# Patient Record
Sex: MALE | Race: WHITE | NOT HISPANIC OR LATINO | Employment: OTHER | URBAN - METROPOLITAN AREA
[De-identification: names, ages, dates, MRNs, and addresses within clinical notes are randomized per-mention and may not be internally consistent; named-entity substitution may affect disease eponyms.]

---

## 2019-01-10 ENCOUNTER — TRANSCRIBE ORDERS (OUTPATIENT)
Dept: ADMINISTRATIVE | Facility: HOSPITAL | Age: 64
End: 2019-01-10

## 2019-01-10 DIAGNOSIS — R94.39 ABNORMAL STRESS TEST: Primary | ICD-10-CM

## 2019-01-18 ENCOUNTER — HOSPITAL ENCOUNTER (OUTPATIENT)
Facility: HOSPITAL | Age: 64
Setting detail: HOSPITAL OUTPATIENT SURGERY
Discharge: HOME | End: 2019-01-18
Attending: INTERNAL MEDICINE | Admitting: INTERNAL MEDICINE

## 2019-01-18 VITALS
WEIGHT: 147.93 LBS | SYSTOLIC BLOOD PRESSURE: 101 MMHG | OXYGEN SATURATION: 95 % | HEART RATE: 57 BPM | BODY MASS INDEX: 24.65 KG/M2 | HEIGHT: 65 IN | TEMPERATURE: 98.7 F | DIASTOLIC BLOOD PRESSURE: 76 MMHG | RESPIRATION RATE: 16 BRPM

## 2019-01-18 DIAGNOSIS — R94.39 ABNORMAL STRESS TEST: ICD-10-CM

## 2019-01-18 LAB
ANION GAP SERPL CALCULATED.3IONS-SCNC: 4 MMOL/L (ref 3–11)
BUN BLD-MCNC: 19 MG/DL (ref 9–23)
BUN/CREAT SERPL: 20.9 (ref 7–25)
CALCIUM SPEC-SCNC: 9.5 MG/DL (ref 8.7–10.4)
CHLORIDE SERPL-SCNC: 107 MMOL/L (ref 99–109)
CO2 SERPL-SCNC: 28 MMOL/L (ref 20–31)
CREAT BLD-MCNC: 0.91 MG/DL (ref 0.6–1.3)
DEPRECATED RDW RBC AUTO: 47.4 FL (ref 37–54)
ERYTHROCYTE [DISTWIDTH] IN BLOOD BY AUTOMATED COUNT: 14 % (ref 11.3–14.5)
GFR SERPL CREATININE-BSD FRML MDRD: 84 ML/MIN/1.73
GLUCOSE BLD-MCNC: 100 MG/DL (ref 70–100)
HCT VFR BLD AUTO: 44.5 % (ref 38.9–50.9)
HGB BLD-MCNC: 14.9 G/DL (ref 13.1–17.5)
MCH RBC QN AUTO: 31.2 PG (ref 27–31)
MCHC RBC AUTO-ENTMCNC: 33.5 G/DL (ref 32–36)
MCV RBC AUTO: 93.3 FL (ref 80–99)
PLATELET # BLD AUTO: 330 10*3/MM3 (ref 150–450)
PMV BLD AUTO: 10 FL (ref 6–12)
POTASSIUM BLD-SCNC: 4 MMOL/L (ref 3.5–5.5)
RBC # BLD AUTO: 4.77 10*6/MM3 (ref 4.2–5.76)
SODIUM BLD-SCNC: 139 MMOL/L (ref 132–146)
WBC NRBC COR # BLD: 12.96 10*3/MM3 (ref 3.5–10.8)

## 2019-01-18 PROCEDURE — 93458 L HRT ARTERY/VENTRICLE ANGIO: CPT | Performed by: INTERNAL MEDICINE

## 2019-01-18 PROCEDURE — C1753 CATH, INTRAVAS ULTRASOUND: HCPCS | Performed by: INTERNAL MEDICINE

## 2019-01-18 PROCEDURE — 25010000002 HEPARIN (PORCINE) PER 1000 UNITS: Performed by: INTERNAL MEDICINE

## 2019-01-18 PROCEDURE — C1769 GUIDE WIRE: HCPCS | Performed by: INTERNAL MEDICINE

## 2019-01-18 PROCEDURE — C1874 STENT, COATED/COV W/DEL SYS: HCPCS | Performed by: INTERNAL MEDICINE

## 2019-01-18 PROCEDURE — C1887 CATHETER, GUIDING: HCPCS | Performed by: INTERNAL MEDICINE

## 2019-01-18 PROCEDURE — 92978 ENDOLUMINL IVUS OCT C 1ST: CPT | Performed by: INTERNAL MEDICINE

## 2019-01-18 PROCEDURE — 25010000002 BIVALIRUDIN TRIFLUOROACETATE 250 MG RECONSTITUTED SOLUTION 1 EACH VIAL: Performed by: INTERNAL MEDICINE

## 2019-01-18 PROCEDURE — C1725 CATH, TRANSLUMIN NON-LASER: HCPCS | Performed by: INTERNAL MEDICINE

## 2019-01-18 PROCEDURE — 25010000002 FENTANYL CITRATE (PF) 100 MCG/2ML SOLUTION: Performed by: INTERNAL MEDICINE

## 2019-01-18 PROCEDURE — 0 IOPAMIDOL PER 1 ML: Performed by: INTERNAL MEDICINE

## 2019-01-18 PROCEDURE — C1894 INTRO/SHEATH, NON-LASER: HCPCS | Performed by: INTERNAL MEDICINE

## 2019-01-18 PROCEDURE — 85027 COMPLETE CBC AUTOMATED: CPT | Performed by: INTERNAL MEDICINE

## 2019-01-18 PROCEDURE — 36415 COLL VENOUS BLD VENIPUNCTURE: CPT

## 2019-01-18 PROCEDURE — 80048 BASIC METABOLIC PNL TOTAL CA: CPT | Performed by: INTERNAL MEDICINE

## 2019-01-18 PROCEDURE — C9600 PERC DRUG-EL COR STENT SING: HCPCS | Performed by: INTERNAL MEDICINE

## 2019-01-18 PROCEDURE — 25010000002 MIDAZOLAM PER 1 MG: Performed by: INTERNAL MEDICINE

## 2019-01-18 DEVICE — XIENCE SIERRA™ EVEROLIMUS ELUTING CORONARY STENT SYSTEM 2.50 MM X 23 MM / RAPID-EXCHANGE
Type: IMPLANTABLE DEVICE | Status: FUNCTIONAL
Brand: XIENCE SIERRA™

## 2019-01-18 DEVICE — XIENCE SIERRA™ EVEROLIMUS ELUTING CORONARY STENT SYSTEM 3.00 MM X 18 MM / RAPID-EXCHANGE
Type: IMPLANTABLE DEVICE | Status: FUNCTIONAL
Brand: XIENCE SIERRA™

## 2019-01-18 RX ORDER — ASPIRIN 81 MG/1
81 TABLET ORAL DAILY
COMMUNITY

## 2019-01-18 RX ORDER — ALPRAZOLAM 0.25 MG/1
0.25 TABLET ORAL 3 TIMES DAILY PRN
Status: DISCONTINUED | OUTPATIENT
Start: 2019-01-18 | End: 2019-01-18 | Stop reason: HOSPADM

## 2019-01-18 RX ORDER — METOPROLOL TARTRATE 50 MG/1
50 TABLET, FILM COATED ORAL 2 TIMES DAILY
Status: ON HOLD | COMMUNITY
End: 2020-06-13 | Stop reason: SDUPTHER

## 2019-01-18 RX ORDER — FENTANYL CITRATE 50 UG/ML
INJECTION, SOLUTION INTRAMUSCULAR; INTRAVENOUS AS NEEDED
Status: DISCONTINUED | OUTPATIENT
Start: 2019-01-18 | End: 2019-01-18 | Stop reason: HOSPADM

## 2019-01-18 RX ORDER — SODIUM CHLORIDE 9 MG/ML
250 INJECTION, SOLUTION INTRAVENOUS CONTINUOUS
Status: ACTIVE | OUTPATIENT
Start: 2019-01-18 | End: 2019-01-18

## 2019-01-18 RX ORDER — TEMAZEPAM 7.5 MG/1
7.5 CAPSULE ORAL NIGHTLY PRN
Status: DISCONTINUED | OUTPATIENT
Start: 2019-01-18 | End: 2019-01-18 | Stop reason: HOSPADM

## 2019-01-18 RX ORDER — NALOXONE HCL 0.4 MG/ML
0.4 VIAL (ML) INJECTION
Status: DISCONTINUED | OUTPATIENT
Start: 2019-01-18 | End: 2019-01-18 | Stop reason: HOSPADM

## 2019-01-18 RX ORDER — MORPHINE SULFATE 2 MG/ML
1 INJECTION, SOLUTION INTRAMUSCULAR; INTRAVENOUS EVERY 4 HOURS PRN
Status: DISCONTINUED | OUTPATIENT
Start: 2019-01-18 | End: 2019-01-18 | Stop reason: HOSPADM

## 2019-01-18 RX ORDER — LIDOCAINE HYDROCHLORIDE 10 MG/ML
INJECTION, SOLUTION EPIDURAL; INFILTRATION; INTRACAUDAL; PERINEURAL AS NEEDED
Status: DISCONTINUED | OUTPATIENT
Start: 2019-01-18 | End: 2019-01-18 | Stop reason: HOSPADM

## 2019-01-18 RX ORDER — ASPIRIN 325 MG
325 TABLET, DELAYED RELEASE (ENTERIC COATED) ORAL DAILY
Status: DISCONTINUED | OUTPATIENT
Start: 2019-01-18 | End: 2019-01-18 | Stop reason: HOSPADM

## 2019-01-18 RX ORDER — CLOPIDOGREL BISULFATE 75 MG/1
75 TABLET ORAL DAILY
COMMUNITY

## 2019-01-18 RX ORDER — HYDROCODONE BITARTRATE AND ACETAMINOPHEN 5; 325 MG/1; MG/1
1 TABLET ORAL EVERY 4 HOURS PRN
Status: DISCONTINUED | OUTPATIENT
Start: 2019-01-18 | End: 2019-01-18 | Stop reason: HOSPADM

## 2019-01-18 RX ORDER — MIDAZOLAM HYDROCHLORIDE 1 MG/ML
INJECTION INTRAMUSCULAR; INTRAVENOUS AS NEEDED
Status: DISCONTINUED | OUTPATIENT
Start: 2019-01-18 | End: 2019-01-18 | Stop reason: HOSPADM

## 2019-01-18 RX ORDER — ACETAMINOPHEN 325 MG/1
650 TABLET ORAL EVERY 4 HOURS PRN
Status: DISCONTINUED | OUTPATIENT
Start: 2019-01-18 | End: 2019-01-18 | Stop reason: HOSPADM

## 2019-01-18 RX ORDER — NITROGLYCERIN 0.4 MG/1
0.4 TABLET SUBLINGUAL
COMMUNITY

## 2019-01-18 RX ORDER — LISINOPRIL AND HYDROCHLOROTHIAZIDE 20; 12.5 MG/1; MG/1
1 TABLET ORAL DAILY
COMMUNITY
End: 2020-06-13 | Stop reason: HOSPADM

## 2019-01-18 RX ADMIN — ASPIRIN 325 MG: 325 TABLET, DELAYED RELEASE ORAL at 07:10

## 2019-01-18 NOTE — H&P
Pre-Cardiac Catheterization Report  Cardiovascular Laboratory  [facility]      Patient:  Ranjeet Lau  :  1955  PCP:  Guilherme Granger MD  PHONE:  [phone]    DATE: 2019    BRIEF HPI:  Ranjeet Lau is a 63 y.o. male with known coronary artery disease and ongoing tobacco abuse.  He is post multiple stents.  He is complaining of chest pain which he describes as an ache that has been increasing for a month.  He states it is moderate in severity lasting minutes and associated with shortness of breath and dyspnea on exertion.  His symptoms increase with stress and are decreased with rest.  He recently underwent a stress test in our office that was abnormal he now presents for left heart catheterization with possible intervention.    Cardiac Risk Factors:  advanced age (older than 55 for men, 65 for women), dyslipidemia, family history of premature cardiovascular disease, hypertension, male gender, smoking/ tobacco exposure    Anginal class in last 2 weeks:  CCS class III    CHF Class in last 2 weeks:  NYHA Class II    Cardiogenic shock:  no    Cardiac arrest <24 hours:  no    Stress test within last 6 months:   yes   Details:    Previous cardiac catheterization:  yes  Details:     Previous CABG:  no  Details:      Allergies:     IV contrast allergy:  no  No Known Allergies    MEDICATIONS:  Prior to Admission medications    Medication Sig Start Date End Date Taking? Authorizing Provider   aspirin 81 MG EC tablet Take 81 mg by mouth Daily.   Yes Madeline Amado MD   clopidogrel (PLAVIX) 75 MG tablet Take 75 mg by mouth Daily.   Yes Madeline Amado MD   lisinopril-hydrochlorothiazide (PRINZIDE,ZESTORETIC) 20-12.5 MG per tablet Take 1 tablet by mouth Daily.   Yes Madeline Amado MD   metoprolol tartrate (LOPRESSOR) 50 MG tablet Take 50 mg by mouth 2 (Two) Times a Day.   Yes Madeline Amado MD   nitroglycerin (NITROSTAT) 0.4 MG SL tablet Place 0.4 mg under the  tongue Every 5 (Five) Minutes As Needed for Chest Pain. Take no more than 3 doses in 15 minutes.   Yes Provider, Madeline, MD       Past medical & surgical history, social and family history reviewed in the electronic medical record.    ROS:  Cardiovascular ROS: positive for - chest pain, dyspnea on exertion and shortness of breath    Physical Exam:    Vitals:   Vitals:    01/18/19 0631   BP: 90/77   Pulse:    Temp:    SpO2:           01/18/19  0629   Weight: 67.1 kg (147 lb 14.9 oz)       General Appearance:    Alert, cooperative, in no acute distress   Head:    Normocephalic, without obvious abnormality, atraumatic   Eyes:            Lids and lashes normal, conjunctivae and sclerae normal, no   icterus, no pallor, corneas clear, PERRLA   Ears:    Ears appear intact with no abnormalities noted   Throat:      Neck:   No adenopathy, supple, trachea midline, no thyromegaly, +   carotid bruit, no JVD   Back:     No kyphosis present, no scoliosis present, range of motion normal   Lungs:     Clear to auscultation,respirations regular, even and                  unlabored    Heart:    Regular rhythm and normal rate, normal S1 and S2, no            murmur, no gallop, no rub, no click   Chest Wall:    No abnormalities observed   Abdomen:     Normal bowel sounds, no masses, no organomegaly, soft        non-tender, non-distended, no guarding, no rebound                tenderness   Rectal:     Deferred   Extremities:   Moves all extremities well, no edema, no cyanosis, no             redness   Pulses:   DP/PT pulses not palpable    Skin:   No bleeding, bruising or rash   Lymph nodes:      Neurologic:   Cranial nerves 2 - 12 grossly intact, sensation intact     Barbaeu Test:  Left: Normal  (oxymetric Allens) Right: Not Assessed     Results from last 7 days   Lab Units 01/18/19  0633   SODIUM mmol/L 139   POTASSIUM mmol/L 4.0   CHLORIDE mmol/L 107   CO2 mmol/L 28.0   BUN mg/dL 19   CREATININE mg/dL 0.91   GLUCOSE mg/dL 100    CALCIUM mg/dL 9.5         No results found for: CHLPL, TRIG, HDL, LDLDIRECT, AST, ALT        Impression      · Abnormal stress test    Plan     · Procedure to perform: LH  · Planned access:  Left radial artery              JENNIFER Herrera  01/18/19  7:08 AM

## 2019-01-21 ENCOUNTER — CALL CENTER PROGRAMS (OUTPATIENT)
Dept: CALL CENTER | Facility: HOSPITAL | Age: 64
End: 2019-01-21

## 2019-01-21 NOTE — OUTREACH NOTE
PCI Survey      Responses   Facility patient discharged from?  [facility]   Procedure date  01/18/19   PCI site:  Left, Arm   Performing MD Dr. Akira Orozco   Attempt successful?  Yes   Call start time  1647   Call end time  1648   Is the patient taking prescribed medications:  ASA, Plavix   Nursing intervention  Reminded to continue to take prescribed medications   Does the patient have an appointment scheduled with the cardiologist?  No   Appointment comments  Office to call.   Did the patient feel prepared to go home on the same day as the procedure?  Yes   Is the patient satisfied with the same day discharge process?  Yes   PCI call completed  Yes          Madi Bay RN

## 2019-01-21 NOTE — OUTREACH NOTE
PCI Survey      Responses   Facility patient discharged from?  [facility]   Procedure date  01/18/19   PCI site:  Left, Arm   Performing MD Dr. Akira Orozco   Attempt successful?  No   Unsuccessful attempts  Attempt 1          Madi Bay RN

## 2020-06-11 ENCOUNTER — HOSPITAL ENCOUNTER (INPATIENT)
Facility: HOSPITAL | Age: 65
LOS: 1 days | Discharge: HOME | End: 2020-06-13
Attending: EMERGENCY MEDICINE | Admitting: INTERNAL MEDICINE

## 2020-06-11 ENCOUNTER — APPOINTMENT (OUTPATIENT)
Dept: CT IMAGING | Facility: HOSPITAL | Age: 65
End: 2020-06-11

## 2020-06-11 ENCOUNTER — APPOINTMENT (OUTPATIENT)
Dept: GENERAL RADIOLOGY | Facility: HOSPITAL | Age: 65
End: 2020-06-11

## 2020-06-11 DIAGNOSIS — N17.9 ACUTE RENAL FAILURE, UNSPECIFIED ACUTE RENAL FAILURE TYPE (HCC): ICD-10-CM

## 2020-06-11 DIAGNOSIS — R42 VERTIGO: Primary | ICD-10-CM

## 2020-06-11 DIAGNOSIS — E86.0 DEHYDRATION: ICD-10-CM

## 2020-06-11 PROBLEM — I10 HTN (HYPERTENSION): Status: ACTIVE | Noted: 2020-06-11

## 2020-06-11 PROBLEM — Z72.0 TOBACCO USE: Status: ACTIVE | Noted: 2020-06-11

## 2020-06-11 PROBLEM — I25.10 CAD (CORONARY ARTERY DISEASE): Status: ACTIVE | Noted: 2020-06-11

## 2020-06-11 LAB
ALBUMIN SERPL-MCNC: 4.3 G/DL (ref 3.5–5.2)
ALBUMIN/GLOB SERPL: 1.5 G/DL
ALP SERPL-CCNC: 75 U/L (ref 39–117)
ALT SERPL W P-5'-P-CCNC: 31 U/L (ref 1–41)
ANION GAP SERPL CALCULATED.3IONS-SCNC: 20 MMOL/L (ref 5–15)
AST SERPL-CCNC: 32 U/L (ref 1–40)
BASOPHILS # BLD AUTO: 0.05 10*3/MM3 (ref 0–0.2)
BASOPHILS NFR BLD AUTO: 0.4 % (ref 0–1.5)
BILIRUB SERPL-MCNC: 0.2 MG/DL (ref 0.2–1.2)
BILIRUB UR QL STRIP: NEGATIVE
BUN BLD-MCNC: 70 MG/DL (ref 8–23)
BUN/CREAT SERPL: 15.5 (ref 7–25)
CALCIUM SPEC-SCNC: 9.8 MG/DL (ref 8.6–10.5)
CHLORIDE SERPL-SCNC: 96 MMOL/L (ref 98–107)
CLARITY UR: CLEAR
CO2 SERPL-SCNC: 19 MMOL/L (ref 22–29)
COLOR UR: YELLOW
CREAT BLD-MCNC: 4.53 MG/DL (ref 0.76–1.27)
DEPRECATED RDW RBC AUTO: 48.9 FL (ref 37–54)
EOSINOPHIL # BLD AUTO: 0.17 10*3/MM3 (ref 0–0.4)
EOSINOPHIL NFR BLD AUTO: 1.5 % (ref 0.3–6.2)
ERYTHROCYTE [DISTWIDTH] IN BLOOD BY AUTOMATED COUNT: 14.2 % (ref 12.3–15.4)
GFR SERPL CREATININE-BSD FRML MDRD: 13 ML/MIN/1.73
GFR SERPL CREATININE-BSD FRML MDRD: ABNORMAL ML/MIN/{1.73_M2}
GLOBULIN UR ELPH-MCNC: 2.9 GM/DL
GLUCOSE BLD-MCNC: 103 MG/DL (ref 65–99)
GLUCOSE UR STRIP-MCNC: NEGATIVE MG/DL
HCT VFR BLD AUTO: 41 % (ref 37.5–51)
HGB BLD-MCNC: 14.1 G/DL (ref 13–17.7)
HGB UR QL STRIP.AUTO: NEGATIVE
HOLD SPECIMEN: NORMAL
HOLD SPECIMEN: NORMAL
IMM GRANULOCYTES # BLD AUTO: 0.05 10*3/MM3 (ref 0–0.05)
IMM GRANULOCYTES NFR BLD AUTO: 0.4 % (ref 0–0.5)
KETONES UR QL STRIP: ABNORMAL
LEUKOCYTE ESTERASE UR QL STRIP.AUTO: NEGATIVE
LIPASE SERPL-CCNC: 89 U/L (ref 13–60)
LYMPHOCYTES # BLD AUTO: 2.89 10*3/MM3 (ref 0.7–3.1)
LYMPHOCYTES NFR BLD AUTO: 24.7 % (ref 19.6–45.3)
MCH RBC QN AUTO: 32.3 PG (ref 26.6–33)
MCHC RBC AUTO-ENTMCNC: 34.4 G/DL (ref 31.5–35.7)
MCV RBC AUTO: 94 FL (ref 79–97)
MONOCYTES # BLD AUTO: 1.14 10*3/MM3 (ref 0.1–0.9)
MONOCYTES NFR BLD AUTO: 9.7 % (ref 5–12)
NEUTROPHILS # BLD AUTO: 7.42 10*3/MM3 (ref 1.7–7)
NEUTROPHILS NFR BLD AUTO: 63.3 % (ref 42.7–76)
NITRITE UR QL STRIP: NEGATIVE
NRBC BLD AUTO-RTO: 0 /100 WBC (ref 0–0.2)
NT-PROBNP SERPL-MCNC: 237.2 PG/ML (ref 5–900)
PH UR STRIP.AUTO: <=5 [PH] (ref 5–8)
PLATELET # BLD AUTO: 198 10*3/MM3 (ref 140–450)
PMV BLD AUTO: 10 FL (ref 6–12)
POTASSIUM BLD-SCNC: 3.3 MMOL/L (ref 3.5–5.2)
PROT SERPL-MCNC: 7.2 G/DL (ref 6–8.5)
PROT UR QL STRIP: ABNORMAL
RBC # BLD AUTO: 4.36 10*6/MM3 (ref 4.14–5.8)
SODIUM BLD-SCNC: 135 MMOL/L (ref 136–145)
SP GR UR STRIP: 1.01 (ref 1–1.03)
TROPONIN T SERPL-MCNC: <0.01 NG/ML (ref 0–0.03)
UROBILINOGEN UR QL STRIP: ABNORMAL
WBC NRBC COR # BLD: 11.72 10*3/MM3 (ref 3.4–10.8)
WHOLE BLOOD HOLD SPECIMEN: NORMAL
WHOLE BLOOD HOLD SPECIMEN: NORMAL

## 2020-06-11 PROCEDURE — 85025 COMPLETE CBC W/AUTO DIFF WBC: CPT

## 2020-06-11 PROCEDURE — 74176 CT ABD & PELVIS W/O CONTRAST: CPT

## 2020-06-11 PROCEDURE — 93005 ELECTROCARDIOGRAM TRACING: CPT

## 2020-06-11 PROCEDURE — 84484 ASSAY OF TROPONIN QUANT: CPT

## 2020-06-11 PROCEDURE — 71045 X-RAY EXAM CHEST 1 VIEW: CPT

## 2020-06-11 PROCEDURE — 70450 CT HEAD/BRAIN W/O DYE: CPT

## 2020-06-11 PROCEDURE — 81003 URINALYSIS AUTO W/O SCOPE: CPT | Performed by: EMERGENCY MEDICINE

## 2020-06-11 PROCEDURE — 87205 SMEAR GRAM STAIN: CPT | Performed by: INTERNAL MEDICINE

## 2020-06-11 PROCEDURE — 93005 ELECTROCARDIOGRAM TRACING: CPT | Performed by: EMERGENCY MEDICINE

## 2020-06-11 PROCEDURE — 83735 ASSAY OF MAGNESIUM: CPT | Performed by: INTERNAL MEDICINE

## 2020-06-11 PROCEDURE — 80053 COMPREHEN METABOLIC PANEL: CPT

## 2020-06-11 PROCEDURE — 83690 ASSAY OF LIPASE: CPT

## 2020-06-11 PROCEDURE — 99223 1ST HOSP IP/OBS HIGH 75: CPT | Performed by: INTERNAL MEDICINE

## 2020-06-11 PROCEDURE — 83880 ASSAY OF NATRIURETIC PEPTIDE: CPT

## 2020-06-11 PROCEDURE — 99284 EMERGENCY DEPT VISIT MOD MDM: CPT

## 2020-06-11 RX ORDER — ASPIRIN 81 MG/1
324 TABLET, CHEWABLE ORAL ONCE
Status: DISCONTINUED | OUTPATIENT
Start: 2020-06-11 | End: 2020-06-13 | Stop reason: HOSPADM

## 2020-06-11 RX ORDER — DEXTROSE, SODIUM CHLORIDE, AND POTASSIUM CHLORIDE 5; .9; .15 G/100ML; G/100ML; G/100ML
100 INJECTION INTRAVENOUS CONTINUOUS
Status: DISCONTINUED | OUTPATIENT
Start: 2020-06-11 | End: 2020-06-12

## 2020-06-11 RX ORDER — SODIUM CHLORIDE 0.9 % (FLUSH) 0.9 %
10 SYRINGE (ML) INJECTION AS NEEDED
Status: DISCONTINUED | OUTPATIENT
Start: 2020-06-11 | End: 2020-06-13 | Stop reason: HOSPADM

## 2020-06-11 RX ADMIN — SODIUM CHLORIDE 2000 ML: 9 INJECTION, SOLUTION INTRAVENOUS at 22:32

## 2020-06-11 RX ADMIN — DEXTROSE MONOHYDRATE, SODIUM CHLORIDE, AND POTASSIUM CHLORIDE 100 ML/HR: 50; 9; 1.49 INJECTION, SOLUTION INTRAVENOUS at 23:59

## 2020-06-12 PROBLEM — I20.9 ANGINA PECTORIS (HCC): Status: ACTIVE | Noted: 2020-06-12

## 2020-06-12 PROBLEM — E87.6 HYPOKALEMIA: Status: ACTIVE | Noted: 2020-06-12

## 2020-06-12 PROBLEM — R42 VERTIGO: Status: ACTIVE | Noted: 2020-06-12

## 2020-06-12 LAB
ANION GAP SERPL CALCULATED.3IONS-SCNC: 10 MMOL/L (ref 5–15)
BUN BLD-MCNC: 53 MG/DL (ref 8–23)
BUN/CREAT SERPL: 19.9 (ref 7–25)
CALCIUM SPEC-SCNC: 8.4 MG/DL (ref 8.6–10.5)
CHLORIDE SERPL-SCNC: 107 MMOL/L (ref 98–107)
CO2 SERPL-SCNC: 21 MMOL/L (ref 22–29)
CREAT BLD-MCNC: 2.67 MG/DL (ref 0.76–1.27)
DEPRECATED RDW RBC AUTO: 50.9 FL (ref 37–54)
EOSINOPHIL SPEC QL MICRO: 0 % EOS/100 CELLS (ref 0–0)
ERYTHROCYTE [DISTWIDTH] IN BLOOD BY AUTOMATED COUNT: 14.4 % (ref 12.3–15.4)
GFR SERPL CREATININE-BSD FRML MDRD: 24 ML/MIN/1.73
GLUCOSE BLD-MCNC: 108 MG/DL (ref 65–99)
HCT VFR BLD AUTO: 36.1 % (ref 37.5–51)
HGB BLD-MCNC: 11.9 G/DL (ref 13–17.7)
MAGNESIUM SERPL-MCNC: 2.2 MG/DL (ref 1.6–2.4)
MCH RBC QN AUTO: 31.6 PG (ref 26.6–33)
MCHC RBC AUTO-ENTMCNC: 33 G/DL (ref 31.5–35.7)
MCV RBC AUTO: 96 FL (ref 79–97)
PLATELET # BLD AUTO: 167 10*3/MM3 (ref 140–450)
PMV BLD AUTO: 10.2 FL (ref 6–12)
POTASSIUM BLD-SCNC: 3.4 MMOL/L (ref 3.5–5.2)
RBC # BLD AUTO: 3.76 10*6/MM3 (ref 4.14–5.8)
SODIUM BLD-SCNC: 138 MMOL/L (ref 136–145)
TROPONIN T SERPL-MCNC: <0.01 NG/ML (ref 0–0.03)
WBC NRBC COR # BLD: 8.21 10*3/MM3 (ref 3.4–10.8)

## 2020-06-12 PROCEDURE — 84484 ASSAY OF TROPONIN QUANT: CPT | Performed by: PHYSICIAN ASSISTANT

## 2020-06-12 PROCEDURE — 99232 SBSQ HOSP IP/OBS MODERATE 35: CPT | Performed by: INTERNAL MEDICINE

## 2020-06-12 PROCEDURE — 85027 COMPLETE CBC AUTOMATED: CPT | Performed by: PHYSICIAN ASSISTANT

## 2020-06-12 PROCEDURE — 80048 BASIC METABOLIC PNL TOTAL CA: CPT | Performed by: PHYSICIAN ASSISTANT

## 2020-06-12 PROCEDURE — 25010000002 HEPARIN (PORCINE) PER 1000 UNITS: Performed by: PHYSICIAN ASSISTANT

## 2020-06-12 RX ORDER — SODIUM CHLORIDE 9 MG/ML
75 INJECTION, SOLUTION INTRAVENOUS CONTINUOUS
Status: DISCONTINUED | OUTPATIENT
Start: 2020-06-12 | End: 2020-06-13

## 2020-06-12 RX ORDER — SODIUM CHLORIDE 0.9 % (FLUSH) 0.9 %
10 SYRINGE (ML) INJECTION EVERY 12 HOURS SCHEDULED
Status: DISCONTINUED | OUTPATIENT
Start: 2020-06-12 | End: 2020-06-13 | Stop reason: HOSPADM

## 2020-06-12 RX ORDER — CLOPIDOGREL BISULFATE 75 MG/1
75 TABLET ORAL DAILY
Status: DISCONTINUED | OUTPATIENT
Start: 2020-06-12 | End: 2020-06-13 | Stop reason: HOSPADM

## 2020-06-12 RX ORDER — SODIUM CHLORIDE 0.9 % (FLUSH) 0.9 %
10 SYRINGE (ML) INJECTION AS NEEDED
Status: DISCONTINUED | OUTPATIENT
Start: 2020-06-12 | End: 2020-06-13 | Stop reason: HOSPADM

## 2020-06-12 RX ORDER — NITROGLYCERIN 0.4 MG/1
0.4 TABLET SUBLINGUAL
Status: DISCONTINUED | OUTPATIENT
Start: 2020-06-12 | End: 2020-06-13 | Stop reason: HOSPADM

## 2020-06-12 RX ORDER — FLUTICASONE PROPIONATE 50 MCG
2 SPRAY, SUSPENSION (ML) NASAL DAILY
Status: DISCONTINUED | OUTPATIENT
Start: 2020-06-12 | End: 2020-06-13 | Stop reason: HOSPADM

## 2020-06-12 RX ORDER — POTASSIUM CHLORIDE 750 MG/1
40 CAPSULE, EXTENDED RELEASE ORAL ONCE
Status: COMPLETED | OUTPATIENT
Start: 2020-06-12 | End: 2020-06-12

## 2020-06-12 RX ORDER — HEPARIN SODIUM 5000 [USP'U]/ML
5000 INJECTION, SOLUTION INTRAVENOUS; SUBCUTANEOUS EVERY 12 HOURS SCHEDULED
Status: DISCONTINUED | OUTPATIENT
Start: 2020-06-12 | End: 2020-06-13 | Stop reason: HOSPADM

## 2020-06-12 RX ORDER — NICOTINE 21 MG/24HR
1 PATCH, TRANSDERMAL 24 HOURS TRANSDERMAL EVERY 24 HOURS
Status: DISCONTINUED | OUTPATIENT
Start: 2020-06-12 | End: 2020-06-13 | Stop reason: HOSPADM

## 2020-06-12 RX ORDER — ASPIRIN 81 MG/1
81 TABLET ORAL DAILY
Status: DISCONTINUED | OUTPATIENT
Start: 2020-06-12 | End: 2020-06-13 | Stop reason: HOSPADM

## 2020-06-12 RX ADMIN — CLOPIDOGREL BISULFATE 75 MG: 75 TABLET ORAL at 08:56

## 2020-06-12 RX ADMIN — HEPARIN SODIUM 5000 UNITS: 5000 INJECTION, SOLUTION INTRAVENOUS; SUBCUTANEOUS at 08:56

## 2020-06-12 RX ADMIN — ASPIRIN 81 MG: 81 TABLET, COATED ORAL at 08:55

## 2020-06-12 RX ADMIN — SODIUM CHLORIDE 75 ML/HR: 9 INJECTION, SOLUTION INTRAVENOUS at 09:51

## 2020-06-12 RX ADMIN — NICOTINE 1 PATCH: 21 PATCH TRANSDERMAL at 04:00

## 2020-06-12 RX ADMIN — HEPARIN SODIUM 5000 UNITS: 5000 INJECTION, SOLUTION INTRAVENOUS; SUBCUTANEOUS at 20:32

## 2020-06-12 RX ADMIN — FLUTICASONE PROPIONATE 2 SPRAY: 50 SPRAY, METERED NASAL at 17:23

## 2020-06-12 RX ADMIN — POTASSIUM CHLORIDE 40 MEQ: 10 CAPSULE, COATED, EXTENDED RELEASE ORAL at 08:55

## 2020-06-12 NOTE — PLAN OF CARE
Problem: Patient Care Overview  Goal: Plan of Care Review  Outcome: Ongoing (interventions implemented as appropriate)  Flowsheets  Taken 6/12/2020 0052  Progress: improving  Outcome Summary: Pt arrived from ED at 0345. States no chest pain, dizziness, n/v. VSS. Fluids at 100ml/hr. Voiding spontaneously. Will continue to monitor.  Taken 6/12/2020 0345  Plan of Care Reviewed With: patient

## 2020-06-12 NOTE — PLAN OF CARE
Patient was gruff and stubborn.  Wife states that is baseline.  IV fluids changed to NS @75ml/hr.  40 Meq of K given per MD order.  Plan for dc in AM if labs are WNL.  Good UOP noted with no odor and clear yellow urine.  HUGE bowl movement.  Ambulated in the room with SBA.  Will continue to monitor

## 2020-06-12 NOTE — PROGRESS NOTES
Discharge Planning Assessment  [facility]     Patient Name: Ranjeet Lau  MRN: [id number]  Today's Date: 6/12/2020    Admit Date: 6/11/2020    Discharge Needs Assessment     Row Name 06/12/20 1537       Living Environment    Lives With  spouse    Name(s) of Who Lives With Patient  Wife:  Federica    Current Living Arrangements  home/apartment/condo    Family Caregiver if Needed  spouse    Quality of Family Relationships  helpful;involved;supportive    Able to Return to Prior Arrangements  yes       Resource/Environmental Concerns    Resource/Environmental Concerns  none    Transportation Concerns  car, none       Transition Planning    Patient/Family Anticipates Transition to  home with family    Transportation Anticipated  family or friend will provide       Discharge Needs Assessment    Readmission Within the Last 30 Days  no previous admission in last 30 days    Concerns to be Addressed  no discharge needs identified    Equipment Currently Used at Home  none    Equipment Needed After Discharge  none        Discharge Plan     Row Name 06/12/20 153       Plan    Plan  Home with family assistance    Patient/Family in Agreement with Plan  yes    Plan Comments  Met with Mr. Lau and his son at the bedside for discharge planning.  Mr. Lau lives with his wife in a 2 story home, in [place].  Mr. Lau states that he is ambulating independently.  He denies any DME or home health needs.  He states that is assistance is needed, his wife and family can assist him.  No needs identified.  Mr. Lau states that his wife will be transporting him home tomorrow when discharged.    CM will continue to follow.    Final Discharge Disposition Code  01 - home or self-care        Destination      Coordination has not been started for this encounter.      Durable Medical Equipment      Coordination has not been started for this encounter.      Dialysis/Infusion      Coordination has not been started for  this encounter.      Home Medical Care      Coordination has not been started for this encounter.      Therapy      Coordination has not been started for this encounter.      Community Resources      Coordination has not been started for this encounter.          Demographic Summary     Row Name 06/12/20 1536       General Information    Admission Type  inpatient    Arrived From  home    Reason for Consult  discharge planning    General Information Comments  PCP:  Guilherme Granger       Contact Information    Permission Granted to Share Info With          Functional Status     Row Name 06/12/20 1537       Functional Status    Usual Activity Tolerance  good    Current Activity Tolerance  good       Functional Status, IADL    Medications  independent    Meal Preparation  independent    Housekeeping  independent    Laundry  independent    Shopping  independent       Mental Status    General Appearance WDL  WDL        Psychosocial    No documentation.       Abuse/Neglect    No documentation.       Legal    No documentation.       Substance Abuse    No documentation.       Patient Forms    No documentation.           Sara Garcia RN

## 2020-06-12 NOTE — H&P
[facility] Medicine Services  HISTORY AND PHYSICAL    Patient Name: Ranjeet Lau  : 1955  MRN: [id number]  Primary Care Physician: Guilherme Granger MD  Date of admission: 2020      Subjective   Subjective     Chief Complaint:  Dizziness x 4 days     HPI:  Ranjeet Lau is a 64 y.o. male with PMHx CAD s/p stents, HTN, HLD, and tobacco use presents to [facility] ED c/o dizziness x 4 days. The patient reports experiencing the sensation of the room spinning with associated nausea and vomiting. He tried taking Meclizine with no relief in his symptoms. Lying still and closing his eyes improved the dizziness.  Additionally, he reports difficulty with balance and generalized weakness over the last several days. He denies falling or syncope. He c/o decreased urine production and darker urine x 2 days. The patient admits to drinking less fluids over the past week. No new medications. The patient's wife reports the patient's blood pressure was low which is why she decided to bring him in to evaluated. No prior hx of vertigo. The patient reports a hx of right ear dysfunction. He reports seeing an ENT specialist 20 years ago but decided against surgery. He denies hx of kidney disease. The patient smokes 1 PPD. He drinks alcohol infrequently. And lives at home with his wife.     While in the ED, the patient's blood pressure has been low. Labs revealed BUN 70, Creatinine 4.53, potassium 3.3, anion gap 20, WBC 11.72.  Chest x-ray clear.  CT abdomen pelvis negative for acute abnormality.  CT head shows fluid opacification of the right mastoid air cells and likely right message tympanogram with small left mastoid effusion.  The patient received 2L of fluid in the ED.  He will be admitted to the hospitalist service for further medical management.    Review of Systems   Constitutional: Positive for appetite change (decreased). Negative for chills, diaphoresis, fatigue and fever.   HENT: Negative  for congestion, sore throat and trouble swallowing.    Eyes: Positive for visual disturbance. Negative for pain.   Respiratory: Negative for cough, shortness of breath and wheezing.    Cardiovascular: Negative for chest pain, palpitations and leg swelling.   Gastrointestinal: Negative for abdominal pain, blood in stool, constipation, diarrhea, nausea and vomiting.   Genitourinary: Negative for difficulty urinating and dysuria.   Musculoskeletal: Positive for gait problem. Negative for back pain.   Skin: Negative for rash and wound.   Neurological: Positive for dizziness, weakness (generalized ) and headaches. Negative for tremors, seizures, syncope, facial asymmetry, speech difficulty, light-headedness and numbness.   Hematological: Negative for adenopathy. Does not bruise/bleed easily.   Psychiatric/Behavioral: Negative for agitation and confusion.      All other systems reviewed and are negative.     Personal History     Past Medical History:   Diagnosis Date   • Coronary artery disease    • Hypertension    • Kidney stone    • Myocardial infarct (CMS/HCC)    • Sinus congestion        Past Surgical History:   Procedure Laterality Date   • CARDIAC CATHETERIZATION     • CARDIAC CATHETERIZATION N/A 2019    Procedure: Left Heart Cath;  Surgeon: Tyler Orozco MD;  Location: [facility] CATH INVASIVE LOCATION;  Service: Cardiovascular   • CORONARY STENT PLACEMENT     • HERNIA REPAIR         Family History:  Mother  in 70s from heart disease, father  at 45 from lung cancer     Social History:  reports that he has been smoking cigarettes. He has been smoking about 1.00 pack per day. He has never used smokeless tobacco. He reports that he drinks alcohol. He reports that he does not use drugs.  Mostly retired, still   Medications:  Available home medication information reviewed.  Prior to Admission medications    Medication Sig Start Date End Date Taking? Authorizing Provider   aspirin 81 MG EC tablet  Take 81 mg by mouth Daily.   Yes Madeline Amado MD   clopidogrel (PLAVIX) 75 MG tablet Take 75 mg by mouth Daily.   Yes Madeline Amado MD   lisinopril-hydrochlorothiazide (PRINZIDE,ZESTORETIC) 20-12.5 MG per tablet Take 1 tablet by mouth Daily.   Yes Madeline Amado MD   metoprolol tartrate (LOPRESSOR) 50 MG tablet Take 50 mg by mouth 2 (Two) Times a Day.   Yes Madeline Amado MD   nitroglycerin (NITROSTAT) 0.4 MG SL tablet Place 0.4 mg under the tongue Every 5 (Five) Minutes As Needed for Chest Pain. Take no more than 3 doses in 15 minutes.    ProviderMadeline MD         No Known Allergies    Objective   Objective     Vital Signs:   Temp:  [97.7 °F (36.5 °C)] 97.7 °F (36.5 °C)  Heart Rate:  [] 92  Resp:  [12] 12  BP: ()/(62-93) 99/62        Physical Exam   Constitutional: Awake, alert, lying in bed   Eyes: PERRLA, sclerae anicteric, no conjunctival injection  HENT: NCAT, mucous membranes dry  Neck: Supple, no thyromegaly, no lymphadenopathy, trachea midline  Respiratory: Clear to auscultation bilaterally, nonlabored respirations   Cardiovascular: RRR, no murmurs appreciated, palpable pedal pulses bilaterally  Gastrointestinal: Positive bowel sounds, soft, nontender, no distention or guarding   Musculoskeletal: No bilateral ankle edema, no clubbing or cyanosis to extremities  Psychiatric: Appropriate affect, cooperative  Neurologic: Oriented x 3, strength symmetric in all extremities, Cranial Nerves grossly intact to confrontation, speech clear  Skin: No rashes or wounds    Results Reviewed:  I have personally reviewed current lab and radiology data.    Results from last 7 days   Lab Units 06/11/20 2054   WBC 10*3/mm3 11.72*   HEMOGLOBIN g/dL 14.1   HEMATOCRIT % 41.0   PLATELETS 10*3/mm3 198     Results from last 7 days   Lab Units 06/11/20 2054   SODIUM mmol/L 135*   POTASSIUM mmol/L 3.3*   CHLORIDE mmol/L 96*   CO2 mmol/L 19.0*   BUN mg/dL 70*   CREATININE mg/dL 4.53*      GLUCOSE mg/dL 103*   CALCIUM mg/dL 9.8   ALT (SGPT) U/L 31   AST (SGOT) U/L 32   TROPONIN T ng/mL <0.010   PROBNP pg/mL 237.2     Estimated Creatinine Clearance: 15.4 mL/min (A) (by C-G formula based on SCr of 4.53 mg/dL (H)).  Brief Urine Lab Results  (Last result in the past 365 days)      Color   Clarity   Blood   Leuk Est   Nitrite   Protein   CREAT   Urine HCG        06/11/20 2201 Yellow Clear Negative Negative Negative Trace             Imaging Results (Last 24 Hours)     Procedure Component Value Units Date/Time    CT Abdomen Pelvis Without Contrast [[id number]] Collected:  06/11/20 2324     Updated:  06/11/20 2326    Narrative:       CT ABDOMEN AND PELVIS, NONCONTRAST, 6/11/2020    HISTORY:  64-year-old male in the ED with 4 day history of vertigo, nausea and vomiting.    TECHNIQUE:  CT imaging of the abdomen and pelvis ordered without oral or IV contrast. Radiation dose reduction techniques included automated exposure control. Radiation audit for CT and nuclear cardiology exams in the last 12 months: 0.    ABDOMEN FINDINGS:  Liver, pancreas and spleen are normal in size and appearance. No gallbladder distention or bile duct dilatation.    Both kidneys are negative with no visible nephrolithiasis and no evidence of urinary obstruction. Normal caliber abdominal aorta. Small bilateral benign-appearing adrenal adenomas are incidentally noted.    No gastric distention. Tiny hiatal hernia with no distal esophageal dilatation. Mild to moderate sigmoid colonic diverticulosis, but no evidence of acute diverticulitis. Small bowel and colon are otherwise normal in caliber and appearance. The appendix  is not clearly seen, but there is no indirect CT evidence of acute appendicitis.    PELVIS FINDINGS:  Urinary bladder, prostate and rectum are within normal limits. Small fat-containing indirect left inguinal hernia.    Limited lung base images show no active disease.      Impression:       1.  No acute abnormality  within the abdomen or pelvis.  2.  Mild to moderate sigmoid colonic diverticulosis.  3.  Tiny hiatal hernia.  4.  Small fat-containing indirect left inguinal hernia.    Signer Name: Ernst Jones MD   Signed: 6/11/2020 11:24 PM   Workstation Name: [facility]    Radiology [facility]    CT Head Without Contrast [[id number]] Collected:  06/11/20 2320     Updated:  06/11/20 2322    Narrative:       CT HEAD, NONCONTRAST, 6/11/2020    HISTORY:  64-year-old male in the ED complaining of 4 day history dizziness/vertigo. Low blood pressure. Nausea and vomiting.    TECHNIQUE:  CT imaging of the head without IV contrast. Radiation dose reduction techniques included automated exposure control. Radiation audit for CT and nuclear cardiology exams in the last 12 months: 0.    FINDINGS:  No acute intracranial abnormality is demonstrated.    Minimal generalized age-appropriate cerebral volume loss. Tiny chronic lacunar infarct in the posterior left lentiform nucleus.    No evidence of intracranial hemorrhage, mass, mass effect, cerebral edema, extra-axial fluid collection or hydrocephalus. Visualized upper paranasal sinuses are clear.    Fluid opacification of the right mastoid air spaces and likely the right middle ear space. Small left mastoid effusion.      Impression:       1.  No acute intracranial abnormality.  2.  Mild chronic changes as noted.  3.  Fluid opacification of the right mastoid air spaces and likely right mesotympanum. Small left mastoid effusion.    Signer Name: Ernst Jones MD   Signed: 6/11/2020 11:20 PM   Workstation Name: [facility]    Radiology [facility]    XR Chest 1 View [[id number]] Collected:  06/11/20 2119     Updated:  06/11/20 2122    Narrative:       PROCEDURE: CR Chest 1 Vw    COMPARISON:  January 2011     INDICATIONS: Chest Pain triage protocol  Relevant clinical info: C/o chest pain and vertigo starting today. Hx 9 cardiac stints. Hx  hypertension.;Non-isolation. PPE: surgical mask and gloves. kll    TECHNIQUE: Single AP  view of the chest    FINDINGS:  Coronary stent graft present. Cardiomediastinal silhouette is within normal limits given projection. Minor apical pleural base scarring is suggested. Lungs are relatively clear. Osseous structures are intact.      Impression:       No acute disease.         Signer Name: Angelica Cummins MD   Signed: 6/11/2020 9:19 PM   Workstation Name: [facility]    [facility]             Assessment/Plan   Assessment & Plan     Active Hospital Problems    Diagnosis POA   • **Acute renal failure (ARF) (CMS/HCC) [N17.9] Yes   • Angina pectoris (CMS/HCC) [I20.9] Yes   • Vertigo [R42] Yes   • Hypokalemia [E87.6] Yes   • CAD (coronary artery disease) [I25.10] Yes   • HTN (hypertension) [I10] Yes   • Tobacco use [Z72.0] Yes     Mr. Lau is a 64 y.o. male with PMHx CAD s/p stents, HTN, HLD, and tobacco use presents to [facility] ED c/o dizziness x 4 days and found to have creatinine of 4.5    Acute renal failure   Metabolic acidosis   --Creatinine 4.53 (baseline 0.9)  --Received 2L in ED, continue IVF overnight   --Repeat BMP in AM   --Hold Lisinopril-HCTZ  --Strict I&Os    Vertigo, resolved    --Likely multifactorial   --CT head shows fluid opacification of right mastoid air spaces and likely right mesotympanum with a small left mastoid effusion  --Supportive care   --Fall precautions   ___ Consider MRI to eval for vertebrobasilar insufficiency    Hypokalemia  --Mild  --Receiving replacement x 1 liter- bc of renal failure  --Check Magnesium     Angina pectoris   CAD s/p multiple stents   --Troponin negative   --ECG sinus with RBBB, prolonged QTc  --Continue ASA/Plavix   --NTG prn     HTN / HLD   --Hypotensive in ED  --Hold antihypertensives for now     Tobacco use   --Smokes 1 PPD  --Cessation education   --Nicotine replacement     DVT prophylaxis:  [facility]    Admission Communication  Due to current  "limited visitation policies, an attempt will be made to update patient's identified best point-of-contact(s) at admission to discuss plan of care.   Contact: Federica   Relation: Wife   Time of communication: In person    Notes (if applicable):      CODE STATUS:   Code Status and Medical Interventions:   Ordered at: 06/12/20 0034     Level Of Support Discussed With:    Patient     Code Status:    CPR     Medical Interventions (Level of Support Prior to Arrest):    Full     Electronically signed by Lroene Turner PA-C, 06/12/20, 12:03 AM.    Brief Attending Note   I have seen and examined the patient, performing an independent face-to-face diagnostic evaluation with plan of care reviewed and developed with the advanced practice clinician (APC).  Brief Summary Statement/HPI:     Mr. Lau is a 64 y.o. male with PMHx CAD s/p stents, HTN, HLD, and tobacco use presents to [facility] ED c/o dizziness x 4 days and found to have creatinine of 4.5  He reports just a few days of dizziness and decreased intake. Progressive weakness and low BP. Denies CP, SOB, blood loss.    Attending Physical Exam:  BP 99/62   Pulse 92   Temp 97.7 °F (36.5 °C)   Resp 12   Ht 175.3 cm (69\")   Wt 68 kg (150 lb)   SpO2 100%   BMI 22.15 kg/m²     Patient is alert and talkative in no distress at rest  Neck is without mass or JVD  Heart is Reg wo murmur  Lungs are clear wo wheeze or crackle- increased AP diameter  Abd is soft without HSM or mass  MAEW  Skin is without rash  Neurologic exam in nonfocal   Mood is appropriate  Data:    Labs and radiology studies have been reviewed.  Brief Assessment/Plan :  See above for further detailed assessment and plan developed with APC which I have reviewed and/or edited.    I believe this patient meets INPATIENT status due to acute renal failure, need to aggressive hydration and making sure kidney function is spared.  I feel patient’s risk for adverse outcomes and need for care warrant INPATIENT evaluation " and I predict the patient’s care encounter to likely last beyond 2 midnights.      Electronically signed by Abigail Pacheco MD 06/12/20 01:22

## 2020-06-12 NOTE — PROGRESS NOTES
[facility] Medicine Services  PROGRESS NOTE    Patient Name: Ranjeet Lau  : 1955  MRN: [id number]    Date of Admission: 2020  Primary Care Physician: Guilherme Granger MD    Subjective   Subjective     CC:  LAINA    HPI:  Admits to making good urine, renal function improved. Denies any dizziness this morning but hasnt gotten out of bed yet    Review of Systems  Gen- No fevers, chills  CV- No chest pain, palpitations  Resp- No cough, dyspnea  GI- No N/V/D, abd pain      Objective   Objective     Vital Signs:   Temp:  [97.7 °F (36.5 °C)-97.9 °F (36.6 °C)] 97.9 °F (36.6 °C)  Heart Rate:  [] 84  Resp:  [12-17] 17  BP: ()/(62-93) 98/66        Physical Exam:  Constitutional: No acute distress, awake, alert  HENT: NCAT, mucous membranes moist  Respiratory: Clear to auscultation bilaterally, respiratory effort normal   Cardiovascular: RRR, no murmurs  Gastrointestinal: Positive bowel sounds, soft  Musculoskeletal: No bilateral ankle edema  Psychiatric: Flat affect, cooperative  Neurologic: Oriented x 3, speech clear  Skin: No rashes    Results Reviewed:  Results from last 7 days   Lab Units 20  0512 20   WBC 10*3/mm3 8.21 11.72*   HEMOGLOBIN g/dL 11.9* 14.1   HEMATOCRIT % 36.1* 41.0   PLATELETS 10*3/mm3 167 198     Results from last 7 days   Lab Units 20  0512 20   SODIUM mmol/L 138 135*   POTASSIUM mmol/L 3.4* 3.3*   CHLORIDE mmol/L 107 96*   CO2 mmol/L 21.0* 19.0*   BUN mg/dL 53* 70*   CREATININE mg/dL 2.67* 4.53*   GLUCOSE mg/dL 108* 103*   CALCIUM mg/dL 8.4* 9.8   ALT (SGPT) U/L  --  31   AST (SGOT) U/L  --  32   TROPONIN T ng/mL <0.010 <0.010   PROBNP pg/mL  --  237.2     Estimated Creatinine Clearance: 24.1 mL/min (A) (by C-G formula based on SCr of 2.67 mg/dL (H)).    Microbiology Results Abnormal     Procedure Component Value - Date/Time    Eosinophil Smear - Urine, Urine, Clean Catch [[id number]]  (Normal) Collected:   06/11/20 2201    Lab Status:  Final result Specimen:  Urine, Clean Catch Updated:  06/12/20 0600     Eosinophil Smear 0 % EOS/100 Cells     Narrative:       No eosinophil seen          Imaging Results (Last 24 Hours)     Procedure Component Value Units Date/Time    CT Abdomen Pelvis Without Contrast [[id number]] Collected:  06/11/20 2324     Updated:  06/11/20 2326    Narrative:       CT ABDOMEN AND PELVIS, NONCONTRAST, 6/11/2020    HISTORY:  64-year-old male in the ED with 4 day history of vertigo, nausea and vomiting.    TECHNIQUE:  CT imaging of the abdomen and pelvis ordered without oral or IV contrast. Radiation dose reduction techniques included automated exposure control. Radiation audit for CT and nuclear cardiology exams in the last 12 months: 0.    ABDOMEN FINDINGS:  Liver, pancreas and spleen are normal in size and appearance. No gallbladder distention or bile duct dilatation.    Both kidneys are negative with no visible nephrolithiasis and no evidence of urinary obstruction. Normal caliber abdominal aorta. Small bilateral benign-appearing adrenal adenomas are incidentally noted.    No gastric distention. Tiny hiatal hernia with no distal esophageal dilatation. Mild to moderate sigmoid colonic diverticulosis, but no evidence of acute diverticulitis. Small bowel and colon are otherwise normal in caliber and appearance. The appendix  is not clearly seen, but there is no indirect CT evidence of acute appendicitis.    PELVIS FINDINGS:  Urinary bladder, prostate and rectum are within normal limits. Small fat-containing indirect left inguinal hernia.    Limited lung base images show no active disease.      Impression:       1.  No acute abnormality within the abdomen or pelvis.  2.  Mild to moderate sigmoid colonic diverticulosis.  3.  Tiny hiatal hernia.  4.  Small fat-containing indirect left inguinal hernia.    Signer Name: Ernst Jones MD   Signed: 6/11/2020 11:24 PM   Workstation Name: [employer]       [facility]    CT Head Without Contrast [[id number]] Collected:  06/11/20 2320     Updated:  06/11/20 2322    Narrative:       CT HEAD, NONCONTRAST, 6/11/2020    HISTORY:  64-year-old male in the ED complaining of 4 day history dizziness/vertigo. Low blood pressure. Nausea and vomiting.    TECHNIQUE:  CT imaging of the head without IV contrast. Radiation dose reduction techniques included automated exposure control. Radiation audit for CT and nuclear cardiology exams in the last 12 months: 0.    FINDINGS:  No acute intracranial abnormality is demonstrated.    Minimal generalized age-appropriate cerebral volume loss. Tiny chronic lacunar infarct in the posterior left lentiform nucleus.    No evidence of intracranial hemorrhage, mass, mass effect, cerebral edema, extra-axial fluid collection or hydrocephalus. Visualized upper paranasal sinuses are clear.    Fluid opacification of the right mastoid air spaces and likely the right middle ear space. Small left mastoid effusion.      Impression:       1.  No acute intracranial abnormality.  2.  Mild chronic changes as noted.  3.  Fluid opacification of the right mastoid air spaces and likely right mesotympanum. Small left mastoid effusion.    Signer Name: Ernst Jones MD   Signed: 6/11/2020 11:20 PM   [facility]: [email or web address]    [facility]    XR Chest 1 View [[id number]] Collected:  06/11/20 2119     Updated:  06/11/20 2122    Narrative:       PROCEDURE: CR Chest 1 Vw    COMPARISON:  January 2011     INDICATIONS: Chest Pain triage protocol  Relevant clinical info: C/o chest pain and vertigo starting today. Hx 9 cardiac stints. Hx hypertension.;Non-isolation. PPE: surgical mask and gloves. kll    TECHNIQUE: Single AP  view of the chest    FINDINGS:  Coronary stent graft present. Cardiomediastinal silhouette is within normal limits given projection. Minor apical pleural base scarring is suggested. Lungs are  relatively clear. Osseous structures are intact.      Impression:       No acute disease.         Signer Name: Angelica Cummins MD   Signed: 6/11/2020 9:19 PM   Workstation Name: [facility]-    [facility] [place]               I have reviewed the medications:  Scheduled Meds:  aspirin 324 mg Oral Once   aspirin 81 mg Oral Daily   clopidogrel 75 mg Oral Daily   heparin (porcine) 5,000 Units Subcutaneous Q12H   nicotine 1 patch Transdermal Q24H   sodium chloride 10 mL Intravenous Q12H     Continuous Infusions:  sodium chloride 75 mL/hr Last Rate: 75 mL/hr (06/12/20 0951)     PRN Meds:.nitroglycerin  •  sodium chloride  •  sodium chloride    Assessment/Plan   Assessment & Plan     Active Hospital Problems    Diagnosis  POA   • **Acute renal failure (ARF) (CMS/HCC) [N17.9]  Yes   • Angina pectoris (CMS/HCC) [I20.9]  Yes   • Vertigo [R42]  Yes   • Hypokalemia [E87.6]  Yes   • CAD (coronary artery disease) [I25.10]  Yes   • HTN (hypertension) [I10]  Yes   • Tobacco use [Z72.0]  Yes      Resolved Hospital Problems   No resolved problems to display.        Brief Hospital Course to date:  Ranjeet Lau is a 64 y.o. male  with PMHx CAD s/p stents, HTN, HLD, and tobacco use presents to [facility] ED c/o dizziness x 4 days and found to have creatinine of 4.5. Improving with IVFs, denies urinary retention     Acute renal failure   Metabolic acidosis   --Creatinine 4.53 (baseline 0.9)  --improving, change IVF to 0.9 at 75 cc/hr  --Repeat BMP in AM   --Hold Lisinopril-HCTZ  --Strict I&Os     Vertigo, resolved    --Likely multifactorial   --CT head shows fluid opacification of right mastoid air spaces and likely right mesotympanum with a small left mastoid effusion  --Supportive care      Hypokalemia  --Mild  --give 40 meq times one    Angina pectoris   CAD s/p multiple stents   --Troponin negative   --ECG sinus with RBBB, prolonged QTc  --Continue ASA/Plavix   --NTG prn      HTN / HLD   --Hypotensive in  ED  --Hold antihypertensives for now      Tobacco use   --Smokes 1 PPD  --Cessation education   --Nicotine replacement     DVT Prophylaxis:  Asa, plavix    Disposition: I expect the patient to be discharged possibly tomorrow if renal function close to baseline, continue IVF    CODE STATUS:   Code Status and Medical Interventions:   Ordered at: 06/12/20 0034     Level Of Support Discussed With:    Patient     Code Status:    CPR     Medical Interventions (Level of Support Prior to Arrest):    Full         Electronically signed by Gabby Roman DO, 06/12/20, 11:38.

## 2020-06-13 VITALS
HEIGHT: 69 IN | DIASTOLIC BLOOD PRESSURE: 95 MMHG | BODY MASS INDEX: 20.44 KG/M2 | HEART RATE: 115 BPM | OXYGEN SATURATION: 100 % | TEMPERATURE: 97.9 F | WEIGHT: 138 LBS | RESPIRATION RATE: 16 BRPM | SYSTOLIC BLOOD PRESSURE: 147 MMHG

## 2020-06-13 PROBLEM — E87.6 HYPOKALEMIA: Status: RESOLVED | Noted: 2020-06-12 | Resolved: 2020-06-13

## 2020-06-13 PROBLEM — N17.9 ACUTE RENAL FAILURE (ARF) (HCC): Status: RESOLVED | Noted: 2020-06-11 | Resolved: 2020-06-13

## 2020-06-13 PROBLEM — R42 VERTIGO: Status: RESOLVED | Noted: 2020-06-12 | Resolved: 2020-06-13

## 2020-06-13 LAB
ALBUMIN SERPL-MCNC: 3.5 G/DL (ref 3.5–5.2)
ANION GAP SERPL CALCULATED.3IONS-SCNC: 9 MMOL/L (ref 5–15)
BUN BLD-MCNC: 24 MG/DL (ref 8–23)
BUN/CREAT SERPL: 27 (ref 7–25)
CALCIUM SPEC-SCNC: 8.6 MG/DL (ref 8.6–10.5)
CHLORIDE SERPL-SCNC: 112 MMOL/L (ref 98–107)
CO2 SERPL-SCNC: 21 MMOL/L (ref 22–29)
CREAT BLD-MCNC: 0.89 MG/DL (ref 0.76–1.27)
DEPRECATED RDW RBC AUTO: 51.8 FL (ref 37–54)
ERYTHROCYTE [DISTWIDTH] IN BLOOD BY AUTOMATED COUNT: 14.8 % (ref 12.3–15.4)
GFR SERPL CREATININE-BSD FRML MDRD: 86 ML/MIN/1.73
GLUCOSE BLD-MCNC: 93 MG/DL (ref 65–99)
HCT VFR BLD AUTO: 35 % (ref 37.5–51)
HGB BLD-MCNC: 11.8 G/DL (ref 13–17.7)
MCH RBC QN AUTO: 32.3 PG (ref 26.6–33)
MCHC RBC AUTO-ENTMCNC: 33.7 G/DL (ref 31.5–35.7)
MCV RBC AUTO: 95.9 FL (ref 79–97)
PHOSPHATE SERPL-MCNC: 1.6 MG/DL (ref 2.5–4.5)
PHOSPHATE SERPL-MCNC: 1.6 MG/DL (ref 2.5–4.5)
PLATELET # BLD AUTO: 194 10*3/MM3 (ref 140–450)
PMV BLD AUTO: 10.4 FL (ref 6–12)
POTASSIUM BLD-SCNC: 3.6 MMOL/L (ref 3.5–5.2)
RBC # BLD AUTO: 3.65 10*6/MM3 (ref 4.14–5.8)
SODIUM BLD-SCNC: 142 MMOL/L (ref 136–145)
WBC NRBC COR # BLD: 5.65 10*3/MM3 (ref 3.4–10.8)

## 2020-06-13 PROCEDURE — 25010000002 HEPARIN (PORCINE) PER 1000 UNITS: Performed by: PHYSICIAN ASSISTANT

## 2020-06-13 PROCEDURE — 84100 ASSAY OF PHOSPHORUS: CPT | Performed by: INTERNAL MEDICINE

## 2020-06-13 PROCEDURE — 85027 COMPLETE CBC AUTOMATED: CPT | Performed by: INTERNAL MEDICINE

## 2020-06-13 PROCEDURE — 99239 HOSP IP/OBS DSCHRG MGMT >30: CPT | Performed by: NURSE PRACTITIONER

## 2020-06-13 PROCEDURE — 80069 RENAL FUNCTION PANEL: CPT | Performed by: INTERNAL MEDICINE

## 2020-06-13 RX ORDER — METOPROLOL TARTRATE 50 MG/1
25 TABLET, FILM COATED ORAL 2 TIMES DAILY
Start: 2020-06-13

## 2020-06-13 RX ORDER — FLUTICASONE PROPIONATE 50 MCG
2 SPRAY, SUSPENSION (ML) NASAL DAILY
Start: 2020-06-14

## 2020-06-13 RX ADMIN — CLOPIDOGREL BISULFATE 75 MG: 75 TABLET ORAL at 08:23

## 2020-06-13 RX ADMIN — FLUTICASONE PROPIONATE 2 SPRAY: 50 SPRAY, METERED NASAL at 08:24

## 2020-06-13 RX ADMIN — POTASSIUM & SODIUM PHOSPHATES POWDER PACK 280-160-250 MG 2 PACKET: 280-160-250 PACK at 12:53

## 2020-06-13 RX ADMIN — METOPROLOL TARTRATE 25 MG: 25 TABLET, FILM COATED ORAL at 11:34

## 2020-06-13 RX ADMIN — POTASSIUM & SODIUM PHOSPHATES POWDER PACK 280-160-250 MG 2 PACKET: 280-160-250 PACK at 07:19

## 2020-06-13 RX ADMIN — HEPARIN SODIUM 5000 UNITS: 5000 INJECTION, SOLUTION INTRAVENOUS; SUBCUTANEOUS at 08:23

## 2020-06-13 RX ADMIN — ASPIRIN 81 MG: 81 TABLET, COATED ORAL at 08:23

## 2020-06-13 NOTE — DISCHARGE SUMMARY
[facility] Medicine Services  DISCHARGE SUMMARY    Patient Name: Ranjeet Lau  : 1955  MRN: [id number]    Date of Admission: 2020  9:39 PM  Date of Discharge:  2020  Primary Care Physician: Guilherme Granger MD    Consults     No orders found from 2020 to 2020.          Hospital Course     Presenting Problem:   Acute renal failure (ARF) (CMS/HCC) [N17.9]    Active Hospital Problems    Diagnosis  POA   • Angina pectoris (CMS/HCC) [I20.9]  Yes   • CAD (coronary artery disease) [I25.10]  Yes   • HTN (hypertension) [I10]  Yes   • Tobacco use [Z72.0]  Yes      Resolved Hospital Problems    Diagnosis Date Resolved POA   • **Acute renal failure (ARF) (CMS/HCC) [N17.9] 2020 Yes   • Vertigo [R42] 2020 Yes   • Hypokalemia [E87.6] 2020 Yes          Hospital Course:  Ranjeet Lau is a 64 y.o. male  with PMHx CAD s/p stents, HTN, HLD, and tobacco use presents to [facility] ED c/o dizziness x 4 days and found to have creatinine of 4.5. Improving with IVFs, denies urinary retention. CT head showed fluid opacification of right mastoid air spaces and likely right mesotympanum with small left mastoid effusion. Started on flonase and tolerating. Instructed to wean self off nasal decongestant spray.    Patient's dizziness resolved quickly with IVF replacement. BP has remained low and will continue off HCTZ/ lisinopril until follow up with PCP. Will restart 1/2 dose metoprolol due to asymptomatic tachycardia with ambulation. PCP to adjust further/ restart. Creatinine back to baseline. Phosphorus replaced this morning and will be given additional replacement prior to discharge today.      Discharge Follow Up Recommendations for outpatient labs/diagnostics:  Restart 1/2 dose metoprolol. Continue to hold lisinopril/ hctz until follow up with PCP this week for BP evaluation    Day of Discharge     HPI:   Patient feeling back to normal. No further  dizziness. Tolerating diet. No n/v/d. Urinating well.    Review of Systems  Gen- No fevers, chills  CV- No chest pain, palpitations  Resp- No cough, dyspnea  GI- No N/V/D, abd pain        Vital Signs:   Temp:  [97.6 °F (36.4 °C)-97.8 °F (36.6 °C)] 97.6 °F (36.4 °C)  Heart Rate:  [] 110  Resp:  [16-18] 16  BP: (100-129)/(69-89) 127/89     Physical Exam:  Constitutional: No acute distress, awake, alert  HENT: NCAT, mucous membranes moist  Respiratory: Clear to auscultation bilaterally, respiratory effort normal   Cardiovascular: RRR, no murmurs, rubs, or gallops, palpable pedal pulses bilaterally  Gastrointestinal: Positive bowel sounds, soft, nontender, nondistended  Musculoskeletal: No bilateral ankle edema  Psychiatric: Appropriate affect, cooperative  Neurologic: Oriented x 3, strength symmetric in all extremities, Cranial Nerves grossly intact to confrontation, speech clear  Skin: No rashes      Pertinent  and/or Most Recent Results     Results from last 7 days   Lab Units 06/13/20 0514 06/12/20 0512 06/11/20 2054   WBC 10*3/mm3 5.65 8.21 11.72*   HEMOGLOBIN g/dL 11.8* 11.9* 14.1   HEMATOCRIT % 35.0* 36.1* 41.0   PLATELETS 10*3/mm3 194 167 198   SODIUM mmol/L 142 138 135*   POTASSIUM mmol/L 3.6 3.4* 3.3*   CHLORIDE mmol/L 112* 107 96*   CO2 mmol/L 21.0* 21.0* 19.0*   BUN mg/dL 24* 53* 70*   CREATININE mg/dL 0.89 2.67* 4.53*   GLUCOSE mg/dL 93 108* 103*   CALCIUM mg/dL 8.6 8.4* 9.8     Results from last 7 days   Lab Units 06/11/20 2054   BILIRUBIN mg/dL 0.2   ALK PHOS U/L 75   ALT (SGPT) U/L 31   AST (SGOT) U/L 32           Invalid input(s): TG, LDLCALC, LDLREALC  Results from last 7 days   Lab Units 06/12/20  0512 06/11/20  2054   PROBNP pg/mL  --  237.2   TROPONIN T ng/mL <0.010 <0.010       Brief Urine Lab Results  (Last result in the past 365 days)      Color   Clarity   Blood   Leuk Est   Nitrite   Protein   CREAT   Urine HCG        06/11/20 2201 Yellow Clear Negative Negative Negative Trace                Microbiology Results Abnormal     Procedure Component Value - Date/Time    Eosinophil Smear - Urine, Urine, Clean Catch [[id number]]  (Normal) Collected:  06/11/20 2201    Lab Status:  Final result Specimen:  Urine, Clean Catch Updated:  06/12/20 0600     Eosinophil Smear 0 % EOS/100 Cells     Narrative:       No eosinophil seen          Imaging Results (All)     Procedure Component Value Units Date/Time    CT Abdomen Pelvis Without Contrast [[id number]] Collected:  06/11/20 2324     Updated:  06/11/20 2326    Narrative:       CT ABDOMEN AND PELVIS, NONCONTRAST, 6/11/2020    HISTORY:  64-year-old male in the ED with 4 day history of vertigo, nausea and vomiting.    TECHNIQUE:  CT imaging of the abdomen and pelvis ordered without oral or IV contrast. Radiation dose reduction techniques included automated exposure control. Radiation audit for CT and nuclear cardiology exams in the last 12 months: 0.    ABDOMEN FINDINGS:  Liver, pancreas and spleen are normal in size and appearance. No gallbladder distention or bile duct dilatation.    Both kidneys are negative with no visible nephrolithiasis and no evidence of urinary obstruction. Normal caliber abdominal aorta. Small bilateral benign-appearing adrenal adenomas are incidentally noted.    No gastric distention. Tiny hiatal hernia with no distal esophageal dilatation. Mild to moderate sigmoid colonic diverticulosis, but no evidence of acute diverticulitis. Small bowel and colon are otherwise normal in caliber and appearance. The appendix  is not clearly seen, but there is no indirect CT evidence of acute appendicitis.    PELVIS FINDINGS:  Urinary bladder, prostate and rectum are within normal limits. Small fat-containing indirect left inguinal hernia.    Limited lung base images show no active disease.      Impression:       1.  No acute abnormality within the abdomen or pelvis.  2.  Mild to moderate sigmoid colonic diverticulosis.  3.  Tiny hiatal hernia.  4.   Small fat-containing indirect left inguinal hernia.    Signer Name: Ernst Jones MD   Signed: 6/11/2020 11:24 PM   Workstation Name: [facility]    Radiology [facility]    CT Head Without Contrast [[id number]] Collected:  06/11/20 2320     Updated:  06/11/20 2322    Narrative:       CT HEAD, NONCONTRAST, 6/11/2020    HISTORY:  64-year-old male in the ED complaining of 4 day history dizziness/vertigo. Low blood pressure. Nausea and vomiting.    TECHNIQUE:  CT imaging of the head without IV contrast. Radiation dose reduction techniques included automated exposure control. Radiation audit for CT and nuclear cardiology exams in the last 12 months: 0.    FINDINGS:  No acute intracranial abnormality is demonstrated.    Minimal generalized age-appropriate cerebral volume loss. Tiny chronic lacunar infarct in the posterior left lentiform nucleus.    No evidence of intracranial hemorrhage, mass, mass effect, cerebral edema, extra-axial fluid collection or hydrocephalus. Visualized upper paranasal sinuses are clear.    Fluid opacification of the right mastoid air spaces and likely the right middle ear space. Small left mastoid effusion.      Impression:       1.  No acute intracranial abnormality.  2.  Mild chronic changes as noted.  3.  Fluid opacification of the right mastoid air spaces and likely right mesotympanum. Small left mastoid effusion.    Signer Name: Ernst Jones MD   Signed: 6/11/2020 11:20 PM   Workstation Name: [facility]    Radiology [facility]    XR Chest 1 View [[id number]] Collected:  06/11/20 2119     Updated:  06/11/20 2122    Narrative:       PROCEDURE: CR Chest 1 Vw    COMPARISON:  January 2011     INDICATIONS: Chest Pain triage protocol  Relevant clinical info: C/o chest pain and vertigo starting today. Hx 9 cardiac stints. Hx hypertension.;Non-isolation. PPE: surgical mask and gloves. kll    TECHNIQUE: Single AP  view of the chest    FINDINGS:  Coronary  stent graft present. Cardiomediastinal silhouette is within normal limits given projection. Minor apical pleural base scarring is suggested. Lungs are relatively clear. Osseous structures are intact.      Impression:       No acute disease.         Signer Name: Angelica Cummins MD   Signed: 6/11/2020 9:19 PM   Workstation Name: [facility]    [facility] [place]                         Plan for Follow-up of Pending Labs/Results:    Order Current Status    Phosphorus In process        Discharge Details        Discharge Medications      New Medications      Instructions Start Date   fluticasone 50 MCG/ACT nasal spray  Commonly known as:  FLONASE   2 sprays, Each Nare, Daily   Start Date:  June 14, 2020        Continue These Medications      Instructions Start Date   aspirin 81 MG EC tablet   81 mg, Oral, Daily      clopidogrel 75 MG tablet  Commonly known as:  PLAVIX   75 mg, Oral, Daily      nitroglycerin 0.4 MG SL tablet  Commonly known as:  NITROSTAT   0.4 mg, Sublingual, Every 5 Minutes PRN, Take no more than 3 doses in 15 minutes.          Stop These Medications    lisinopril-hydrochlorothiazide 20-12.5 MG per tablet  Commonly known as:  PRINZIDE,ZESTORETIC     metoprolol tartrate 50 MG tablet  Commonly known as:  LOPRESSOR            No Known Allergies      Discharge Disposition:  Home or Self Care    Diet:  Hospital:  Diet Order   Procedures   • Diet Regular       Activity:  Activity Instructions     Activity as Tolerated            Restrictions or Other Recommendations:         CODE STATUS:    Code Status and Medical Interventions:   Ordered at: 06/12/20 0034     Level Of Support Discussed With:    Patient     Code Status:    CPR     Medical Interventions (Level of Support Prior to Arrest):    Full       No future appointments.    Additional Instructions for the Follow-ups that You Need to Schedule     Discharge Follow-up with PCP   As directed       Currently Documented PCP:    Guilherme Granger  MD GERRY    PCP Phone Number:    [phone]     Follow Up Details:  1 week- BP follow up               Time Spent on Discharge:  36 minutes    Electronically signed by CHARO Clancy, 06/13/20, 11:27 AM.

## 2023-11-20 NOTE — ED PROVIDER NOTES
Subjective   64-year-old male presents for evaluation of dizziness, nausea and vomiting, and fatigue.  The patient reports that 4 days ago when he went to stand up he began to feel dizziness.  With any movement that dizziness is replicated.  In association with this he has had nausea vomiting and decreased oral intake.  Secondarily he has began to feel fatigued.  He now presents here for further evaluation.  He denies any chest pain, cough, shortness of breath, sore throat, rhinorrhea, or change in sense of taste or smell.  He denies any infectious symptoms including no fever, bodies, or chills.  He has a known history of coronary artery disease but actually denies any chest pain upon my discussion.  No reported abdominal pain.  He does admit that his urine output has been decreased over the last 3 days but he is also had decreased oral intake.  For the first 2 days of having dizziness he also had diarrhea. However, for the last 2 days his bowel movements have been decreased in frequency and well-formed.  No other reported aggravating, alleviating, or associated symptoms.          Review of Systems   Constitutional: Positive for fatigue. Negative for chills and fever.   HENT: Negative for congestion, ear pain, postnasal drip, sinus pressure and sore throat.    Eyes: Negative for pain, redness and visual disturbance.   Respiratory: Negative for cough, chest tightness and shortness of breath.    Cardiovascular: Negative for chest pain, palpitations and leg swelling.   Gastrointestinal: Positive for nausea and vomiting. Negative for abdominal pain, anal bleeding, blood in stool and diarrhea.   Endocrine: Negative for polydipsia and polyuria.   Genitourinary: Negative for difficulty urinating, dysuria, frequency and urgency.   Musculoskeletal: Negative for arthralgias, back pain and neck pain.   Skin: Negative for pallor and rash.   Allergic/Immunologic: Negative for environmental allergies and immunocompromised state.    Neurological: Positive for dizziness. Negative for weakness and headaches.   Hematological: Negative for adenopathy.   Psychiatric/Behavioral: Negative for confusion, self-injury and suicidal ideas. The patient is not nervous/anxious.    All other systems reviewed and are negative.      Past Medical History:   Diagnosis Date   • Coronary artery disease    • Hypertension    • Kidney stone    • Myocardial infarct (CMS/HCC)    • Sinus congestion        No Known Allergies    Past Surgical History:   Procedure Laterality Date   • CARDIAC CATHETERIZATION     • CARDIAC CATHETERIZATION N/A 1/18/2019    Procedure: Left Heart Cath;  Surgeon: Tyler Orozco MD;  Location: [facility] CATH INVASIVE LOC[facility];  Service: Cardiovascular   • CORONARY STENT PLACEMENT     • HERNIA REPAIR         History reviewed. No pertinent family history.    Social History     Socioeconomic History   • Marital status:      Spouse name: Not on file   • Number of children: Not on file   • Years of education: Not on file   • Highest education level: Not on file   Tobacco Use   • Smoking status: Current Every Day Smoker     Packs/day: 1.00     Types: Cigarettes   • Smokeless tobacco: Never Used   Substance and Sexual Activity   • Alcohol use: Yes     Comment: occasional   • Drug use: No           Objective   Physical Exam   Constitutional: He is oriented to person, place, and time. He appears well-developed and well-nourished.  Non-toxic appearance. No distress.   HENT:   Head: Normocephalic and atraumatic.   Right Ear: External ear normal.   Left Ear: External ear normal.   Nose: Nose normal.   Eyes: Pupils are equal, round, and reactive to light. EOM and lids are normal.   Neck: Normal range of motion. Neck supple. No tracheal deviation present.   Cardiovascular: Normal rate, regular rhythm and normal heart sounds. Exam reveals no gallop, no friction rub and no decreased pulses.   No murmur heard.  Pulmonary/Chest: Effort normal and breath  sounds normal. No respiratory distress. He has no decreased breath sounds. He has no wheezes. He has no rhonchi. He has no rales.   Abdominal: Soft. Normal appearance and bowel sounds are normal. There is no tenderness. There is no rebound and no guarding.   Musculoskeletal: Normal range of motion. He exhibits no deformity.   Lymphadenopathy:     He has no cervical adenopathy.   Neurological: He is alert and oriented to person, place, and time. He has normal strength. No cranial nerve deficit or sensory deficit.   Skin: Skin is warm and dry. No rash noted. He is not diaphoretic.   Psychiatric: He has a normal mood and affect. His speech is normal and behavior is normal. Judgment and thought content normal. Cognition and memory are normal.   Nursing note and vitals reviewed.      Procedures           ED Course                                           MDM  Number of Diagnoses or Management Options  Acute renal failure, unspecified acute renal failure type (CMS/HCC): new and requires workup  Dehydration: new and requires workup  Vertigo: new and requires workup  Diagnosis management comments: No acute abnormalities identified on CT scan of the head, or CT scan of the abdomen pelvis.    Lab evaluation shows acute renal failure with a creatinine greater than 4.  The patient denies previous history of kidney failure and the last labs follow-up for comparison show normal kidney function.    Ultimately IV fluids will be initiated and I will admit the patient for further fluid resuscitation.    I discussed the patient with the hospitalist, Dr. Pacheco, who will admit.       Amount and/or Complexity of Data Reviewed  Clinical lab tests: ordered and reviewed  Tests in the radiology section of CPT®: ordered and reviewed  Decide to obtain previous medical records or to obtain history from someone other than the patient: yes  Obtain history from someone other than the patient: yes  Review and summarize past medical records:  yes  Discuss the patient with other providers: yes        Final diagnoses:   Vertigo   Dehydration   Acute renal failure, unspecified acute renal failure type (CMS/[facility])            Ada Arnold MD  06/12/20 0106     Double Island Pedicle Flap Text: The defect edges were debeveled with a #15 scalpel blade. Given the location of the defect, shape of the defect and the proximity to free margins a double island pedicle advancement flap was deemed most appropriate. Using a sterile surgical marker, an appropriate advancement flap was drawn incorporating the defect, outlining the appropriate donor tissue and placing the expected incisions within the relaxed skin tension lines where possible. The area thus outlined was incised deep to adipose tissue with a #15 scalpel blade. The skin margins were undermined to an appropriate distance in all directions around the primary defect and laterally outward around the island pedicle utilizing iris scissors.  There was minimal undermining beneath the pedicle flap. Following this, the flap was carried over into the primary defect and sutured into place.

## (undated) DEVICE — DEV COMP RAD PRELUDESYNC 24CM

## (undated) DEVICE — GUIDE CATHETER: Brand: MACH1™

## (undated) DEVICE — BALN NC/EUPHORA RX 3.50X15MM

## (undated) DEVICE — GW INQWIRE FC PTFE STD J/1.5 .035 260

## (undated) DEVICE — INTRO SHEATH PRELUDE IDEAL SPRNG COIL 021 6F 23X80CM

## (undated) DEVICE — CATH DIAG EXPO .045 FL3  5F 100CM

## (undated) DEVICE — KODAMA CATHETER, P/N 701470CONTENTS: IMAGING CATHETER, 3 ML SYRINGE, 10 ML SYRINGE, EXTENSION SET, STERILE BAG, IFU: Brand: ACIST KODAMA® CORONARY IMAGING CATHETER

## (undated) DEVICE — MODEL AT P65, P/N 701554-001KIT CONTENTS: HAND CONTROLLER, 3-WAY HIGH-PRESSURE STOPCOCK WITH ROTATING END AND PREMIUM HIGH-PRESSURE TUBING: Brand: ANGIOTOUCH® KIT

## (undated) DEVICE — DEV INFL MONARCH 25W

## (undated) DEVICE — CATH DIAG EXPO M/ PK 5F FL4/FR4 PIG

## (undated) DEVICE — MODEL BT2000 P/N 700287-012KIT CONTENTS: MANIFOLD WITH SALINE AND CONTRAST PORTS, SALINE TUBING WITH SPIKE AND HAND SYRINGE, TRANSDUCER: Brand: BT2000 AUTOMATED MANIFOLD KIT